# Patient Record
Sex: FEMALE | Race: WHITE | NOT HISPANIC OR LATINO | Employment: UNEMPLOYED | URBAN - METROPOLITAN AREA
[De-identification: names, ages, dates, MRNs, and addresses within clinical notes are randomized per-mention and may not be internally consistent; named-entity substitution may affect disease eponyms.]

---

## 2024-09-24 ENCOUNTER — APPOINTMENT (OUTPATIENT)
Dept: URGENT CARE | Facility: CLINIC | Age: 34
End: 2024-09-24
Payer: COMMERCIAL

## 2024-09-27 ENCOUNTER — OFFICE VISIT (OUTPATIENT)
Dept: URGENT CARE | Facility: CLINIC | Age: 34
End: 2024-09-27
Payer: COMMERCIAL

## 2024-09-27 VITALS
BODY MASS INDEX: 28 KG/M2 | TEMPERATURE: 98.4 F | OXYGEN SATURATION: 97 % | SYSTOLIC BLOOD PRESSURE: 116 MMHG | DIASTOLIC BLOOD PRESSURE: 70 MMHG | HEART RATE: 94 BPM | RESPIRATION RATE: 16 BRPM | WEIGHT: 164 LBS | HEIGHT: 64 IN

## 2024-09-27 DIAGNOSIS — M77.8 TENDONITIS OF WRIST, RIGHT: ICD-10-CM

## 2024-09-27 PROCEDURE — 3078F DIAST BP <80 MM HG: CPT | Performed by: PHYSICIAN ASSISTANT

## 2024-09-27 PROCEDURE — 99203 OFFICE O/P NEW LOW 30 MIN: CPT | Performed by: PHYSICIAN ASSISTANT

## 2024-09-27 PROCEDURE — 3074F SYST BP LT 130 MM HG: CPT | Performed by: PHYSICIAN ASSISTANT

## 2024-09-27 RX ORDER — LEVONORGESTREL AND ETHINYL ESTRADIOL 0.1-0.02MG
KIT ORAL
COMMUNITY
Start: 2024-09-08

## 2024-09-27 NOTE — PROGRESS NOTES
"Milton Garrett is a 33 y.o. female who presents with Wrist Pain (Right thumb movement causes sharp pain to wrist x 1 week)            HPI  This is a new problem.   The patient presents to clinic complaining of pain to her right wrist and right thumb x 1 week.  The patient reports no recent injury or trauma to her right wrist or right hand, specifically the right thumb.  She reports no associated swelling or bruising.  The patient states she is experiencing increased pain with extension of the right thumb.  The patient reports no numbness or tingling.  The patient reports no history of similar symptoms.  The patient has not taken any OTC medications for her current symptoms.  The patient is right-handed.      PMH:  has no past medical history on file.  MEDS:   Current Outpatient Medications:     LUTERA 0.1-20 MG-MCG per tablet, TAKE 1 TABLET ORALLY ONCE DAILY AT THE SAME TIME EACH DAY, Disp: , Rfl:   ALLERGIES: No Known Allergies  SURGHX: History reviewed. No pertinent surgical history.  SOCHX:  reports that she has never smoked. She has never used smokeless tobacco. She reports that she does not currently use alcohol. She reports that she does not currently use drugs.  FH: Family history was reviewed, no pertinent findings to report      Review of Systems   Musculoskeletal:  Positive for joint pain.              Objective     /70 (BP Location: Left arm, Patient Position: Sitting, BP Cuff Size: Adult)   Pulse 94   Temp 36.9 °C (98.4 °F) (Temporal)   Resp 16   Ht 1.626 m (5' 4\")   Wt 74.4 kg (164 lb)   SpO2 97%   BMI 28.15 kg/m²      Physical Exam  Constitutional:       General: She is not in acute distress.     Appearance: Normal appearance. She is well-developed. She is not ill-appearing.   HENT:      Head: Normocephalic and atraumatic.      Right Ear: External ear normal.      Left Ear: External ear normal.      Nose: Nose normal.   Eyes:      Extraocular Movements: Extraocular movements " intact.      Conjunctiva/sclera: Conjunctivae normal.   Cardiovascular:      Rate and Rhythm: Normal rate.   Pulmonary:      Effort: Pulmonary effort is normal.   Musculoskeletal:      Cervical back: Normal range of motion and neck supple.      Comments:   Right Wrist:  A localized area of tenderness is present to the radial aspect of the right wrist overlying the extensor tendon of the right thumb.  No edema.  No ecchymosis.  No overlying erythema.  No increased warmth.  No rashes/lesions.  No obvious deformity.  No additional tenderness of the right wrist.  No tenderness of the right hand.  No tenderness to the base of the right thumb.  No tenderness overlying the anatomical snuffbox.  Decreased ROM -patient demonstrates decreased range of motion of the right thumb secondary to pain  Neurovascular intact distally  Strength 5/5   Skin:     General: Skin is warm and dry.   Neurological:      Mental Status: She is alert and oriented to person, place, and time.                             Assessment & Plan        Assessment & Plan  Tendonitis of wrist, right    Orders:    Referral to Sports Medicine          The patient's presenting symptoms and physical exam findings are consistent with tendinitis of the right wrist.  The patient was provided with a right thumb spica splint for her current symptoms.  Will place a referral to sports medicine for further evaluation and management.  Advised the patient to take OTC anti-inflammatories for her current symptoms.  Advised patient to follow-up with primary care as needed.  Discussed return precautions with the patient, and she verbalized understanding.    Differential diagnoses, supportive care, and indications for immediate follow-up discussed with patient.   Instructed to return to clinic or nearest emergency department for any change in condition, further concerns, or worsening of symptoms.    OTC NSAIDs for pain/discomfort   RICE  Wear brace for additional  support  Referral to Sports Medicine   Follow-up with PCP   Return to clinic or go tot he ED if symptoms worsen or fail to improve, or if the patient should develop worsening/increasing pain/tenderness, swelling, bruising, redness or warmth to the affected area, decreased ROM, numbness, tingling or weakness, fever/chills, and/or any concerning symptoms.     Discussed plan with the patient, and she agrees to the above.     I personally reviewed prior external notes and test results pertinent to today's visit.  I have independently reviewed and interpreted all diagnostics ordered during this urgent care visit.     Please note that this dictation was created using voice recognition software. I have made every reasonable attempt to correct obvious errors, but I expect that there may be errors of grammar and possibly content that I did not discover before finalizing the note.     This note was electronically signed by Pamela Curran PA-C